# Patient Record
Sex: FEMALE | Race: OTHER | Employment: PART TIME | URBAN - METROPOLITAN AREA
[De-identification: names, ages, dates, MRNs, and addresses within clinical notes are randomized per-mention and may not be internally consistent; named-entity substitution may affect disease eponyms.]

---

## 2022-06-22 ENCOUNTER — HOSPITAL ENCOUNTER (EMERGENCY)
Facility: OTHER | Age: 37
Discharge: HOME | End: 2022-06-22
Attending: EMERGENCY MEDICINE
Payer: MEDICAID

## 2022-06-22 VITALS
TEMPERATURE: 98 F | DIASTOLIC BLOOD PRESSURE: 70 MMHG | HEART RATE: 91 BPM | RESPIRATION RATE: 16 BRPM | OXYGEN SATURATION: 95 % | SYSTOLIC BLOOD PRESSURE: 124 MMHG

## 2022-06-22 DIAGNOSIS — F10.920 ALCOHOLIC INTOXICATION WITHOUT COMPLICATION: Primary | ICD-10-CM

## 2022-06-22 LAB — POCT GLUCOSE: 114 MG/DL (ref 70–110)

## 2022-06-22 PROCEDURE — 82962 GLUCOSE BLOOD TEST: CPT

## 2022-06-22 PROCEDURE — 99283 EMERGENCY DEPT VISIT LOW MDM: CPT | Mod: 25

## 2022-06-22 NOTE — ED PROVIDER NOTES
Encounter Date: 6/22/2022    SCRIBE #1 NOTE: Matilda BELLA am scribing for, and in the presence of, Nae Chatman MD.       History     Chief Complaint   Patient presents with    Alcohol Intoxication     Found at [facility] security called EMS after pt attempted to walk home and was unsteady on feet     Time seen by provider: 12:28 AM    This is a 36 y.o. female who presents via EMS for alcohol intoxication. EMS reports security at [facility] called after patient was unsteady on her feet and was attempting to walk home. The patient is unable to provide a history due to her state but admits to drinking this evening.  She denies any complaints at this time.    The history is provided by the EMS personnel. The history is limited by the condition of the patient.     Review of patient's allergies indicates:  No Known Allergies  History reviewed. No pertinent past medical history.  No past surgical history on file.  No family history on file.     Review of Systems   Unable to perform ROS: Acuity of condition       Physical Exam     Initial Vitals   BP Pulse Resp Temp SpO2   06/22/22 0035 06/22/22 0035 06/22/22 0038 06/22/22 0038 06/22/22 0038   124/70 91 16 98 °F (36.7 °C) 95 %      MAP       --                Physical Exam    Nursing note and vitals reviewed.  Constitutional: She appears well-developed and well-nourished. She is not diaphoretic. No distress.   Smells of alcohol.   HENT:   Head: Normocephalic and atraumatic.   Right Ear: External ear normal.   Left Ear: External ear normal.   Nose: Nose normal.   Eyes: Conjunctivae, EOM and lids are normal. Right eye exhibits no discharge. Left eye exhibits no discharge. Right conjunctiva is not injected. Right conjunctiva has no hemorrhage. Left conjunctiva is not injected. Left conjunctiva has no hemorrhage. No scleral icterus.   Neck: Phonation normal. Neck supple. No stridor present. No tracheal deviation present.   Normal range of motion.  Cardiovascular: Normal  rate, regular rhythm and normal heart sounds. Exam reveals no friction rub.    No murmur heard.  Pulses:       Radial pulses are 2+ on the right side and 2+ on the left side.        Dorsalis pedis pulses are 2+ on the right side and 2+ on the left side.   Pulmonary/Chest: Breath sounds normal. No respiratory distress. She has no wheezes.   Musculoskeletal:      Cervical back: Normal range of motion and neck supple.     Neurological: She is alert.   Alert to person.   Skin: Skin is warm.   Psychiatric: Her speech is slurred. Cognition and memory are impaired.         ED Course   Procedures  Labs Reviewed   POCT GLUCOSE - Abnormal; Notable for the following components:       Result Value    POCT Glucose 114 (*)     All other components within normal limits   POCT GLUCOSE MONITORING CONTINUOUS          Imaging Results    None          Medications - No data to display  Medical Decision Making:   History:   Old Medical Records: I decided to obtain old medical records.  Clinical Tests:   Lab Tests: Ordered and Reviewed    Additional MDM:   Comments: 36-year-old female presented via EMS intoxicated.  She denies any complaints.  No evidence of trauma on exam and no history of trauma per EMS.  Blood glucose is within normal limits.  The patient was observed on a cardiac monitor without any events until her sober boyfriend arrived.  He agreed to assume responsibility until she is completely sober.  At the time of discharge patient was able to ambulate without assistance.  She was discharged in stable condition in the care of her boyfriend..        Scribe Attestation:   Scribe #1: I performed the above scribed service and the documentation accurately describes the services I performed. I attest to the accuracy of the note.               Physician Attestation for Scribe: I, Nae Chatman  , reviewed documentation as scribed in my presence, which is both accurate and complete.  Clinical Impression:   Final diagnoses:  [F10.920]  Alcoholic intoxication without complication (Primary)          ED Disposition Condition    Discharge Stable        ED Prescriptions     None        Follow-up Information     Follow up With Specialties Details Why Contact Info    primary care  Schedule an appointment as soon as possible for a visit  As needed            Nae Chatman MD  06/22/22 0141

## 2022-06-22 NOTE — ED NOTES
Patient discharged with written and verbal instructions and verbalized understanding. Patient ambulated out of ED in no signs of distress.

## 2022-06-22 NOTE — ED NOTES
Spoke with patient's boyfriend at patient's request. States he will come to the ER to be with patient and transport her home, if necessary.